# Patient Record
Sex: FEMALE | Race: WHITE | NOT HISPANIC OR LATINO | Employment: UNEMPLOYED | URBAN - METROPOLITAN AREA
[De-identification: names, ages, dates, MRNs, and addresses within clinical notes are randomized per-mention and may not be internally consistent; named-entity substitution may affect disease eponyms.]

---

## 2018-02-08 ENCOUNTER — COMMUNICATION - HEALTHEAST (OUTPATIENT)
Dept: SCHEDULING | Facility: CLINIC | Age: 23
End: 2018-02-08

## 2021-05-27 ENCOUNTER — RECORDS - HEALTHEAST (OUTPATIENT)
Dept: ADMINISTRATIVE | Facility: CLINIC | Age: 26
End: 2021-05-27

## 2021-05-30 ENCOUNTER — RECORDS - HEALTHEAST (OUTPATIENT)
Dept: ADMINISTRATIVE | Facility: CLINIC | Age: 26
End: 2021-05-30

## 2021-05-31 ENCOUNTER — RECORDS - HEALTHEAST (OUTPATIENT)
Dept: ADMINISTRATIVE | Facility: CLINIC | Age: 26
End: 2021-05-31

## 2021-06-01 ENCOUNTER — RECORDS - HEALTHEAST (OUTPATIENT)
Dept: ADMINISTRATIVE | Facility: CLINIC | Age: 26
End: 2021-06-01

## 2021-12-24 ENCOUNTER — APPOINTMENT (OUTPATIENT)
Dept: RADIOLOGY | Facility: CLINIC | Age: 26
End: 2021-12-24
Attending: EMERGENCY MEDICINE
Payer: COMMERCIAL

## 2021-12-24 ENCOUNTER — HOSPITAL ENCOUNTER (EMERGENCY)
Facility: CLINIC | Age: 26
Discharge: HOME | End: 2021-12-24
Attending: EMERGENCY MEDICINE | Admitting: EMERGENCY MEDICINE
Payer: COMMERCIAL

## 2021-12-24 VITALS
DIASTOLIC BLOOD PRESSURE: 94 MMHG | WEIGHT: 220 LBS | SYSTOLIC BLOOD PRESSURE: 141 MMHG | TEMPERATURE: 97.5 F | OXYGEN SATURATION: 99 % | RESPIRATION RATE: 20 BRPM | HEART RATE: 111 BPM

## 2021-12-24 DIAGNOSIS — S93.402A SPRAIN OF LEFT ANKLE, UNSPECIFIED LIGAMENT, INITIAL ENCOUNTER: ICD-10-CM

## 2021-12-24 DIAGNOSIS — S83.422A SPRAIN OF LATERAL COLLATERAL LIGAMENT OF LEFT KNEE, INITIAL ENCOUNTER: ICD-10-CM

## 2021-12-24 PROCEDURE — 73610 X-RAY EXAM OF ANKLE: CPT | Mod: LT

## 2021-12-24 PROCEDURE — 73562 X-RAY EXAM OF KNEE 3: CPT | Mod: LT

## 2021-12-24 PROCEDURE — 99284 EMERGENCY DEPT VISIT MOD MDM: CPT | Mod: 25

## 2021-12-24 PROCEDURE — 250N000013 HC RX MED GY IP 250 OP 250 PS 637: Performed by: EMERGENCY MEDICINE

## 2021-12-24 PROCEDURE — 29515 APPLICATION SHORT LEG SPLINT: CPT | Mod: LT

## 2021-12-24 RX ORDER — ACETAMINOPHEN 325 MG/1
975 TABLET ORAL ONCE
Status: COMPLETED | OUTPATIENT
Start: 2021-12-24 | End: 2021-12-24

## 2021-12-24 RX ORDER — ACETAMINOPHEN 325 MG/1
325 TABLET ORAL ONCE
Status: DISCONTINUED | OUTPATIENT
Start: 2021-12-24 | End: 2021-12-24

## 2021-12-24 RX ADMIN — ACETAMINOPHEN 975 MG: 325 TABLET ORAL at 01:33

## 2021-12-24 NOTE — ED TRIAGE NOTES
Patient reports that she was walking in heels @0000 on carpet and her L ankle turned causing her to lose balance and then her L knee turned as well, patient is having pain to L ankle and knee and pain is going up and down her L calf.  Henny Medina RN.......12/24/2021 1:25 AM

## 2021-12-24 NOTE — ED PROVIDER NOTES
EMERGENCY DEPARTMENT ENCOUNTER      NAME: Katy King  AGE: 26 year old female  YOB: 1995  MRN: [id number]  EVALUATION DATE & TIME: 2021  2:10 AM    PCP: No Ref-Primary, Physician    ED PROVIDER: William Alegre D.O.      Chief Complaint   Patient presents with     Ankle Pain     Knee Pain       FINAL IMPRESSION:  1. Sprain of left ankle, unspecified ligament, initial encounter    2. Sprain of lateral collateral ligament of left knee, initial encounter        ED COURSE & MEDICAL DECISION MAKIN:18 AM I met with the patient to gather history and to perform my initial exam. I discussed the plan for care while in the Emergency Department. PPE worn including N95 mask, surgical gloves, surgical cap.  2:24 AM Discussed plans for discharge.      Pertinent Labs & Imaging studies reviewed. (See chart for details)  26 year old female presents to the Emergency Department for evaluation of status post mechanical fall with left ankle and left knee pain.  There is no obvious instability on exam, and x-rays did not show any evidence of fracture.  Do suspect sprain of both the ankle and knee.  Patient will be placed in a splint of the ankle.  Instructed on return precautions and follow-up with primary care.  Patient was offered crutches but reports that she has some at home and does not need any.    At the conclusion of the encounter I discussed the results of all of the tests and the disposition. The questions were answered. The patient or family acknowledged understanding and was agreeable with the care plan.      HPI    Patient information was obtained from: Patient    Use of : N/A       Katy King is a 26 year old female with no recorded pertinent medical history who presents by private vehicle for the evaluation of knee pain, ankle pain. Patient reports she was wearing high heels and walking on carpet when she twisted her left ankle causing her to lose balance today at 0000.  Since then she reports left lateral ankle pain, pain on the top of her foot, left knee pain worse with straightening her knee. No other complaints at this time. Endorses previous wrist, shoulder surgeries.    SHx- Endorses alcohol use (occasional). Denies tobacco use.      REVIEW OF SYSTEMS  Constitutional:  Denies fever, chills, weight loss or weakness  Eyes:  No pain, discharge, redness  HENT:  Denies sore throat, ear pain, congestion  Respiratory: No SOB, wheeze or cough  Cardiovascular:  No CP, palpitations  GI:  Denies abdominal pain, nausea, vomiting, diarrhea  : Denies dysuria, hematuria  Musculoskeletal: Endorses arthralgias (left knee, left lateral ankle), ankle swelling (left) Denies any new muscle pain or loss of function.  Skin:  Denies rash, pallor  Neurologic:  Denies headache, focal weakness or sensory changes  Lymph: Denies swollen nodes    All other systems negative unless noted in HPI.    PAST MEDICAL HISTORY:  History reviewed. No pertinent past medical history.    PAST SURGICAL HISTORY:  History reviewed. No pertinent surgical history.      CURRENT MEDICATIONS:    No current facility-administered medications for this encounter.     No current outpatient medications on file.         ALLERGIES:  Allergies   Allergen Reactions     Other Environmental Allergy Anaphylaxis     Penicillins Hives       FAMILY HISTORY:  History reviewed. No pertinent family history.    SOCIAL HISTORY:  Social History     Socioeconomic History     Marital status: Single     Spouse name: Not on file     Number of children: Not on file     Years of education: Not on file     Highest education level: Not on file   Occupational History     Not on file   Tobacco Use     Smoking status: Not on file     Smokeless tobacco: Not on file   Substance and Sexual Activity     Alcohol use: Not on file     Drug use: Not on file     Sexual activity: Not on file   Other Topics Concern     Not on file   Social History Narrative     Not on  file     Social Determinants of Health     Financial Resource Strain: Not on file   Food Insecurity: Not on file   Transportation Needs: Not on file   Physical Activity: Not on file   Stress: Not on file   Social Connections: Not on file   Intimate Partner Violence: Not on file   Housing Stability: Not on file       VITALS:  Patient Vitals for the past 24 hrs:   BP Temp Temp src Pulse Resp SpO2 Weight   12/24/21 0125 (!) 141/94 97.5  F (36.4  C) Oral 111 20 99 % 99.8 kg (220 lb)       PHYSICAL EXAM    Vitals: BP (!) 141/94   Pulse 111   Temp 97.5  F (36.4  C) (Oral)   Resp 20   Wt 99.8 kg (220 lb)   SpO2 99%   General: Appears in no acute distress, awake, alert, interactive.  Eyes: Conjunctivae non-injected. Sclera anicteric.  HENT: Atraumatic, normocephalic  Neck: Supple, normal ROM  Respiratory/Chest: Respiration unlabored, no tachypnea  Abdomen: non distended  Musculoskeletal: Bruising to the left lateral ankle with slight swelling. Tenderness over the lateral collateral ligament of the left knee without ligament instability. No edema or erythema.  Skin: Normal color. No rash or diaphoresis.  Neurologic: Alert and oriented, face symmetric, moves all extremities spontaneously. Speech clear.  Psychiatric:  Affect normal, Judgment normal, Mood normal.        LABS  Results for orders placed or performed during the hospital encounter of 12/24/21 (from the past 24 hour(s))   XR Knee Left 3 Views    Narrative    EXAM: XR KNEE LEFT 3 VIEWS  LOCATION: [facility]  DATE/TIME: 12/24/2021 1:44 AM    INDICATION: Injury. Left knee pain.  COMPARISON: None.      Impression    IMPRESSION: No visible fracture or dislocation.   XR Ankle Left G/E 3 Views    Narrative    EXAM: XR ANKLE LEFT G/E 3 VIEWS  LOCATION: [facility]  DATE/TIME: 12/24/2021 1:43 AM    INDICATION: Injury. Left ankle pain.  COMPARISON: None.      Impression    IMPRESSION: No visible fracture or dislocation.          RADIOLOGY  XR Ankle Left G/E 3 Views   Final Result   IMPRESSION: No visible fracture or dislocation.      XR Knee Left 3 Views   Final Result   IMPRESSION: No visible fracture or dislocation.          MEDICATIONS GIVEN IN THE EMERGENCY:  Medications   acetaminophen (TYLENOL) tablet 975 mg (975 mg Oral Given 12/24/21 0133)       NEW PRESCRIPTIONS STARTED AT TODAY'S ER VISIT  New Prescriptions    No medications on file        I, Brian Sharif, am serving as a scribe to document services personally performed by William Alegre D.O., based on my observations and the provider's statements to me.  I, William Alegre D.O., attest that Brian Sharif is acting in a scribe capacity, has observed my performance of the services and has documented them in accordance with my direction.      William Alegre D.O.  Emergency Medicine  [facility] EMERGENCY ROOM  [address] [id number]  [phone]  Dept: [phone]     William Alegre,   12/24/21 0234

## 2025-04-25 ENCOUNTER — TRANSFERRED RECORDS (OUTPATIENT)
Dept: HEALTH INFORMATION MANAGEMENT | Facility: CLINIC | Age: 30
End: 2025-04-25

## 2025-06-09 ENCOUNTER — TRANSCRIBE ORDERS (OUTPATIENT)
Dept: OTHER | Age: 30
End: 2025-06-09

## 2025-06-09 ENCOUNTER — MEDICAL CORRESPONDENCE (OUTPATIENT)
Dept: HEALTH INFORMATION MANAGEMENT | Facility: CLINIC | Age: 30
End: 2025-06-09
Payer: COMMERCIAL

## 2025-06-09 DIAGNOSIS — D22.9 ATYPICAL NEVUS: Primary | ICD-10-CM

## 2025-06-10 ENCOUNTER — TELEPHONE (OUTPATIENT)
Dept: DERMATOLOGY | Facility: CLINIC | Age: 30
End: 2025-06-10
Payer: COMMERCIAL

## 2025-06-10 NOTE — TELEPHONE ENCOUNTER
This encounter is being sent to inform the clinic that this patient has a referral from Karina Weber APRN CNP from [facility] in [place] (Phone: [phone], Fax: [phone])   for the diagnoses of Atypical nevus; Skin Lesion and has requested that this patient be seen within Urgent: 3-5 Days and/or with Urgent: 3-5 Days. Based on the availability of our provider(s), we are unable to accommodate this request.    Were all sites offered this patient?  Yes  Needs [facility] only in [place] please  Does scheduling algorithm request to schedule next available?  Patient appointment has not been scheduled.  Please review the referral request for accommodation and contact the patient.  If unable to accommodate, please resubmit a referral and indicate a preferred partner or affiliate location using Provider Finder or Scheduling Instructions field.       Pt needs Dr Dann Asif - Pt said had Bx done at [facility] who sent it to [facility] and pathology results came back as positive for Malignant Melanoma - and Pt is to be seen ASAP - it is on Pt's scalp on head    Records in [facility]  Records with [facility]  Records with [facility]  Records with [facility]    Pt's dad she said has been talking with Dr Dann Asif's team - so you should know about this Pt said and they said you received the records from [facility] and you were reviewing the records and would be calling back to schedule (Pt's dad is Tono King (Rick))    Please call Pt to schedule with Dr Dann Asif    Thank you!

## 2025-06-25 NOTE — TELEPHONE ENCOUNTER
Spoke with Darin at [facility] and let her know that this pt needs a referral to surgical oncology rather than dermatology surgery. Darin will let the care team know. Advised her to call us should they need anything else from us, callback number provided.     Milagros STONE RN  Dermatology Surgery

## 2025-06-25 NOTE — TELEPHONE ENCOUNTER
Melani Donaldson MD to Me  (Selected Message)        6/25/25  2:29 PM  Yes that would be best! Do you mind letting them know?  Me to Melani Donaldson MD         6/25/25  2:25 PM  Would the original provider need to submit that referral to surgical oncology?   Melani Donaldson MD to Me         6/25/25  2:21 PM  Olivier Linares,     Unless there were communications with Dr. Asif behind the scenes I am not aware of, patient needs a referral to surgical oncology for treatment of this lesion as it is a malignant melanoma with Breslow thickness of at least 1.1 mm on the frontal scalp.     -LL